# Patient Record
(demographics unavailable — no encounter records)

---

## 2025-03-10 NOTE — DISCUSSION/SUMMARY
[Procedure Low Risk] : the procedure risk is low [Patient Low Risk] : the patient is a low surgical risk [Optimized for Surgery] : the patient is optimized for surgery [Coronary Artery Disease] : coronary artery disease [Stable] : stable [Continue] : continuing statins [Moderate Mitral Regurgitation] : moderate mitral regurgitation [Compensated] : compensated [Patient] : the patient [FreeTextEntry3] : stop asa 1 week preop  [FreeTextEntry1] : ILR reports pending  neuro eval  carotid no lesion noted  asa and plavix for now  [EKG obtained to assist in diagnosis and management of assessed problem(s)] : EKG obtained to assist in diagnosis and management of assessed problem(s)

## 2025-03-10 NOTE — ADDENDUM
[FreeTextEntry1] : James Peña assisted in documentation on Mar 10 2025 acting as a scribe for Dr. Ben Banda.
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
## 2025-03-10 NOTE — REVIEW OF SYSTEMS
[Joint Pain] : joint pain [Joint Stiffness] : joint stiffness [Muscle Cramps] : muscle cramps [Fever] : no fever [Headache] : no headache [Feeling Fatigued] : not feeling fatigued [SOB] : no shortness of breath [Leg Claudication] : no intermittent leg claudication [Easy Bleeding] : no tendency for easy bleeding [Swollen Glands] : no swollen glands

## 2025-03-10 NOTE — ASSESSMENT
[FreeTextEntry1] : Patient is a 73-year-old male with prior history of ischemic heart disease status post stenting in the remote past 2017.  In the summer 2024 he sustained a small stroke loop recorder was placed at that time for detection of atrial fibrillation to date no atrial fibrillation has been detected he has been maintained on antiplatelet agents as well as cholesterol reduction.  He more recently in November was diagnosed with pancreatic cancer after presenting with back pain and is getting chemotherapy under the care of an oncologist.  He was noted to have some periorbital edema a BNP level was obtained which was elevated to 250 subsequent BNP was 150 he denies any current symptoms of shortness shortness of breath leg edema back or abdominal distention.  EKG reveals a sinus rhythm with anteroseptal evidence of ischemia echocardiogram which was done today compared to her prior echo was unchanged with an EF of about 50 to 55% and minimal aortic regurgitation.  Periorbital edema is rarely if ever a presentation of congestive heart failure BNP may fluctuate due to IV infusion of fluids during his chemo sessions there is no need for ongoing treatment with diuretics at this time side effects of his medications including chemo were reviewed with the patient family was taking a homeopathic agent obtained from [place] which contains ephedra which has been banned by the FDA in this country and I encouraged them to discontinue use of that medication.

## 2025-03-10 NOTE — PHYSICAL EXAM
[General Appearance - Well Developed] : well developed [Normal Appearance] : normal appearance [Well Groomed] : well groomed [General Appearance - Well Nourished] : well nourished [No Deformities] : no deformities [General Appearance - In No Acute Distress] : no acute distress [Normal Conjunctiva] : the conjunctiva exhibited no abnormalities [Eyelids - No Xanthelasma] : the eyelids demonstrated no xanthelasmas [Normal Oral Mucosa] : normal oral mucosa [No Oral Pallor] : no oral pallor [No Oral Cyanosis] : no oral cyanosis [Normal Jugular Venous A Waves Present] : normal jugular venous A waves present [Normal Jugular Venous V Waves Present] : normal jugular venous V waves present [No Jugular Venous Watkins A Waves] : no jugular venous watkins A waves [Respiration, Rhythm And Depth] : normal respiratory rhythm and effort [Exaggerated Use Of Accessory Muscles For Inspiration] : no accessory muscle use [Auscultation Breath Sounds / Voice Sounds] : lungs were clear to auscultation bilaterally [Abdomen Soft] : soft [Abdomen Tenderness] : non-tender [Abdomen Mass (___ Cm)] : no abdominal mass palpated [Abnormal Walk] : normal gait [Gait - Sufficient For Exercise Testing] : the gait was sufficient for exercise testing [Nail Clubbing] : no clubbing of the fingernails [Cyanosis, Localized] : no localized cyanosis [Petechial Hemorrhages (___cm)] : no petechial hemorrhages [Skin Color & Pigmentation] : normal skin color and pigmentation [] : no rash [No Venous Stasis] : no venous stasis [Skin Lesions] : no skin lesions [No Skin Ulcers] : no skin ulcer [No Xanthoma] : no  xanthoma was observed [FreeTextEntry1] : 2/6 apical sys m no s3